# Patient Record
Sex: FEMALE | Race: AMERICAN INDIAN OR ALASKA NATIVE
[De-identification: names, ages, dates, MRNs, and addresses within clinical notes are randomized per-mention and may not be internally consistent; named-entity substitution may affect disease eponyms.]

---

## 2017-03-16 ENCOUNTER — HOSPITAL ENCOUNTER (OUTPATIENT)
Dept: HOSPITAL 5 - SPVWC | Age: 62
Discharge: HOME | End: 2017-03-16
Attending: PEDIATRICS
Payer: COMMERCIAL

## 2017-03-16 DIAGNOSIS — Z12.31: Primary | ICD-10-CM

## 2017-03-16 PROCEDURE — 77067 SCR MAMMO BI INCL CAD: CPT

## 2017-03-16 PROCEDURE — G0202 SCR MAMMO BI INCL CAD: HCPCS

## 2017-03-16 NOTE — MAMMOGRAPHY REPORT
BILATERAL DIGITAL SCREENING MAMMOGRAM with CAD: 03/16/17 08:13:00



CLINICAL: Routine screening.



COMPARISON:02/25/16



FINDINGS: The breasts are heterogeneously dense, which may obscure 

small masses. No mass, architectural distortion or suspicious 

calcifications.



IMPRESSION: No mammographic evidence of malignancy.



BI-RADS CATEGORY: 1 - - Negative



RECOMMENDATION: Routine mammographic screening in one year.





COMMENT:

Patient follow-up letters are generated by our [employer] application.

## 2018-03-19 ENCOUNTER — HOSPITAL ENCOUNTER (OUTPATIENT)
Dept: HOSPITAL 5 - SPVWC | Age: 63
Discharge: HOME | End: 2018-03-19
Attending: PEDIATRICS
Payer: COMMERCIAL

## 2018-03-19 DIAGNOSIS — Z12.31: Primary | ICD-10-CM

## 2018-03-19 PROCEDURE — 77067 SCR MAMMO BI INCL CAD: CPT

## 2018-03-19 NOTE — MAMMOGRAPHY REPORT
BILATERAL DIGITAL SCREENING MAMMOGRAM with CAD: 03/19/18 08:35:00



CLINICAL: Routine screening.



COMPARISON:03/16/17 and 02/25/16



FINDINGS: The breasts are heterogeneously dense, which may obscure 

small masses. No mass, architectural distortion or suspicious 

calcifications.



IMPRESSION: No mammographic evidence of malignancy.



BI-RADS CATEGORY: 1 - - Negative



RECOMMENDATION: Routine mammographic screening in one year.





COMMENT:

Patient follow-up letters are generated by our [employer] application.

## 2019-03-29 NOTE — MAMMOGRAPHY REPORT
BILATERAL DIGITAL SCREENING MAMMOGRAM with CAD: 03/28/19 13:54:00



CLINICAL: Routine screening.



COMPARISON:03/19/18



FINDINGS: The breasts are heterogeneously dense, which may obscure 

small masses. No mass, architectural distortion or suspicious 

calcifications.



IMPRESSION: No mammographic evidence of malignancy.



BI-RADS CATEGORY: 1 - - Negative



RECOMMENDATION: Routine mammographic screening in one year.





COMMENT:

Patient follow-up letters are generated by our [employer] application.

## 2020-05-22 NOTE — MAMMOGRAPHY REPORT
DIGITAL SCREENING MAMMOGRAM WITH CAD, 5/21/2020



INDICATION: Routine screening mammography. 



TECHNIQUE:  Digital bilateral  2D mammography was obtained in the craniocaudal and mediolateral obliq
ue projections. This examination was interpreted with the benefit of Computer-Aided Detection analysi
s.



COMPARISON: 3/28/2019, 3/19/2018



FINDINGS: 



Breast Density: The breasts are heterogeneously dense, which may obscure small masses.



There is no evidence of dominant mass, suspicious calcifications or architectural distortion in eithe
r breast.



IMPRESSION:



Follow up recommendation: Routine yearly



BI-RADS Category 1:  Negative.



A "normal" or negative report should not discourage follow up or biopsy of a clinically significant f
inding.



A written summary of these findings will be mailed to the patient. The patient will be entered into a
 mammography reporting system which will generate a reminder letter for the patient's next appointmen
t at the appropriate interval.



The [employer] recommends yearly mammograms starting at age 40 and continuing as l
julianne as a woman is in good health.  Breast MRI is recommended for women with an approximate 20-25% or 
greater lifetime risk of breast cancer, including women with a strong family history of breast or ova
tacho cancer or who have been treated for Hodgkin's disease.



Signer Name: Naeem Nettles MD 

Signed: 5/22/2020 9:11 AM

Workstation Name: [id number]